# Patient Record
Sex: MALE | Race: WHITE | NOT HISPANIC OR LATINO | URBAN - METROPOLITAN AREA
[De-identification: names, ages, dates, MRNs, and addresses within clinical notes are randomized per-mention and may not be internally consistent; named-entity substitution may affect disease eponyms.]

---

## 2024-09-19 ENCOUNTER — TELEPHONE (OUTPATIENT)
Dept: HEMATOLOGY/ONCOLOGY | Facility: HOSPITAL | Age: 44
End: 2024-09-19
Payer: COMMERCIAL

## 2024-09-19 NOTE — TELEPHONE ENCOUNTER
RN called patient and left a message on voice mail asking patient to antoine back. Call back instructions reviewed.

## 2024-09-23 ENCOUNTER — PATIENT OUTREACH (OUTPATIENT)
Dept: HEMATOLOGY/ONCOLOGY | Facility: HOSPITAL | Age: 44
End: 2024-09-23
Payer: COMMERCIAL

## 2024-09-23 NOTE — PROGRESS NOTES
RN talked to patient. Patient has hemophilia B and said  that he was 1 years old when he was diagnosed. Patient saw Dr. Coronel at [facility] and had been seeing Dr. Isaiah Deng but he is only in clinic once a week and it has been hard to communicate with him. Patient is seeing Dr. Mahogany Rod in [place] but she want the patient connected to the [facility] in [place]. Patient aware of date, time and place. RN sent ane mail to patient with directions on how to find the appointment. Call back instructions reviewed.

## 2024-09-30 ENCOUNTER — DOCUMENTATION (OUTPATIENT)
Dept: HEMATOLOGY/ONCOLOGY | Facility: HOSPITAL | Age: 44
End: 2024-09-30

## 2024-09-30 ENCOUNTER — OFFICE VISIT (OUTPATIENT)
Dept: HEMATOLOGY/ONCOLOGY | Facility: HOSPITAL | Age: 44
End: 2024-09-30
Payer: COMMERCIAL

## 2024-09-30 ENCOUNTER — LAB (OUTPATIENT)
Dept: LAB | Facility: HOSPITAL | Age: 44
End: 2024-09-30
Payer: COMMERCIAL

## 2024-09-30 ENCOUNTER — DOCUMENTATION (OUTPATIENT)
Dept: PEDIATRIC HEMATOLOGY/ONCOLOGY | Facility: HOSPITAL | Age: 44
End: 2024-09-30

## 2024-09-30 VITALS
RESPIRATION RATE: 16 BRPM | DIASTOLIC BLOOD PRESSURE: 88 MMHG | HEART RATE: 73 BPM | HEIGHT: 70 IN | WEIGHT: 202.9 LBS | OXYGEN SATURATION: 99 % | BODY MASS INDEX: 29.05 KG/M2 | SYSTOLIC BLOOD PRESSURE: 130 MMHG | TEMPERATURE: 97 F

## 2024-09-30 DIAGNOSIS — D67 FACTOR IX HEMOPHILIA (MULTI): ICD-10-CM

## 2024-09-30 DIAGNOSIS — K76.0 FATTY LIVER DISEASE, NONALCOHOLIC: Primary | ICD-10-CM

## 2024-09-30 LAB
ABO GROUP (TYPE) IN BLOOD: NORMAL
APTT PPP: 39 SECONDS (ref 27–38)
BASOPHILS # BLD AUTO: 0.02 X10*3/UL (ref 0–0.1)
BASOPHILS NFR BLD AUTO: 0.4 %
EOSINOPHIL # BLD AUTO: 0.02 X10*3/UL (ref 0–0.7)
EOSINOPHIL NFR BLD AUTO: 0.4 %
ERYTHROCYTE [DISTWIDTH] IN BLOOD BY AUTOMATED COUNT: 12 % (ref 11.5–14.5)
FERRITIN SERPL-MCNC: 104 NG/ML (ref 20–300)
HBV CORE AB SER QL: NONREACTIVE
HBV SURFACE AB SER-ACNC: >1000 MIU/ML
HBV SURFACE AG SERPL QL IA: NONREACTIVE
HCT VFR BLD AUTO: 43.4 % (ref 41–52)
HCV AB SER QL: REACTIVE
HGB BLD-MCNC: 15.2 G/DL (ref 13.5–17.5)
HGB RETIC QN: 38 PG (ref 28–38)
HIV 1+2 AB+HIV1 P24 AG SERPL QL IA: NONREACTIVE
IMM GRANULOCYTES # BLD AUTO: 0.02 X10*3/UL (ref 0–0.7)
IMM GRANULOCYTES NFR BLD AUTO: 0.4 % (ref 0–0.9)
IMMATURE RETIC FRACTION: 9.7 %
INR PPP: 1 (ref 0.9–1.1)
IRON SATN MFR SERPL: 21 % (ref 25–45)
IRON SERPL-MCNC: 89 UG/DL (ref 35–150)
LYMPHOCYTES # BLD AUTO: 1.54 X10*3/UL (ref 1.2–4.8)
LYMPHOCYTES NFR BLD AUTO: 27.2 %
MCH RBC QN AUTO: 33.3 PG (ref 26–34)
MCHC RBC AUTO-ENTMCNC: 35 G/DL (ref 32–36)
MCV RBC AUTO: 95 FL (ref 80–100)
MONOCYTES # BLD AUTO: 0.4 X10*3/UL (ref 0.1–1)
MONOCYTES NFR BLD AUTO: 7.1 %
NEUTROPHILS # BLD AUTO: 3.67 X10*3/UL (ref 1.2–7.7)
NEUTROPHILS NFR BLD AUTO: 64.5 %
NRBC BLD-RTO: 0 /100 WBCS (ref 0–0)
PLATELET # BLD AUTO: 279 X10*3/UL (ref 150–450)
PROTHROMBIN TIME: 10.9 SECONDS (ref 9.8–12.8)
RBC # BLD AUTO: 4.57 X10*6/UL (ref 4.5–5.9)
RETICS #: 0.08 X10*6/UL (ref 0.02–0.12)
RETICS/RBC NFR AUTO: 1.7 % (ref 0.5–2)
RH FACTOR (ANTIGEN D): NORMAL
TIBC SERPL-MCNC: 432 UG/DL (ref 240–445)
UIBC SERPL-MCNC: 343 UG/DL (ref 110–370)
WBC # BLD AUTO: 5.7 X10*3/UL (ref 4.4–11.3)

## 2024-09-30 PROCEDURE — 86706 HEP B SURFACE ANTIBODY: CPT

## 2024-09-30 PROCEDURE — 99215 OFFICE O/P EST HI 40 MIN: CPT | Performed by: STUDENT IN AN ORGANIZED HEALTH CARE EDUCATION/TRAINING PROGRAM

## 2024-09-30 PROCEDURE — 36415 COLL VENOUS BLD VENIPUNCTURE: CPT

## 2024-09-30 PROCEDURE — 85250 CLOT FACTOR IX PTC/CHRSTMAS: CPT

## 2024-09-30 PROCEDURE — 87389 HIV-1 AG W/HIV-1&-2 AB AG IA: CPT

## 2024-09-30 PROCEDURE — 82728 ASSAY OF FERRITIN: CPT

## 2024-09-30 PROCEDURE — 86704 HEP B CORE ANTIBODY TOTAL: CPT

## 2024-09-30 PROCEDURE — 86803 HEPATITIS C AB TEST: CPT

## 2024-09-30 PROCEDURE — 85025 COMPLETE CBC W/AUTO DIFF WBC: CPT

## 2024-09-30 PROCEDURE — 85045 AUTOMATED RETICULOCYTE COUNT: CPT

## 2024-09-30 PROCEDURE — 87522 HEPATITIS C REVRS TRNSCRPJ: CPT

## 2024-09-30 PROCEDURE — 99205 OFFICE O/P NEW HI 60 MIN: CPT | Performed by: STUDENT IN AN ORGANIZED HEALTH CARE EDUCATION/TRAINING PROGRAM

## 2024-09-30 PROCEDURE — 85610 PROTHROMBIN TIME: CPT

## 2024-09-30 PROCEDURE — 85730 THROMBOPLASTIN TIME PARTIAL: CPT

## 2024-09-30 PROCEDURE — 83540 ASSAY OF IRON: CPT

## 2024-09-30 PROCEDURE — 87340 HEPATITIS B SURFACE AG IA: CPT

## 2024-09-30 PROCEDURE — 86901 BLOOD TYPING SEROLOGIC RH(D): CPT

## 2024-09-30 PROCEDURE — 3008F BODY MASS INDEX DOCD: CPT | Performed by: STUDENT IN AN ORGANIZED HEALTH CARE EDUCATION/TRAINING PROGRAM

## 2024-09-30 RX ORDER — CHOLECALCIFEROL (VITAMIN D3) 25 MCG
1000 TABLET ORAL
COMMUNITY

## 2024-09-30 RX ORDER — MULTIVIT-MIN/IRON FUM/FOLIC AC 7.5 MG-4
1 TABLET ORAL DAILY
COMMUNITY

## 2024-09-30 RX ORDER — PANTOPRAZOLE SODIUM 40 MG/1
1 TABLET, DELAYED RELEASE ORAL
COMMUNITY
Start: 2024-06-27

## 2024-09-30 ASSESSMENT — ENCOUNTER SYMPTOMS
OCCASIONAL FEELINGS OF UNSTEADINESS: 0
DEPRESSION: 0
LOSS OF SENSATION IN FEET: 0

## 2024-09-30 ASSESSMENT — PAIN SCALES - GENERAL: PAINLEVEL: 0-NO PAIN

## 2024-09-30 NOTE — PROGRESS NOTES
Patient ID: Killian Tena II is a 44 y.o. male.  Referring Physician: Mahogany Rod MD  [address] [place] [address]  Primary Care Provider: Morgan Santoyo MD, Richie Coffey MD in [place], [place]  Visit Type: Initial Visit  Diagnosis: severe IX deficiency/hemophilia B      Subjective    HPI  44 y.o. male with a PMH significant for known hemophilia B severe with recurrent bleeds.     Hemophilia B/IX deficiency:   Subtype: severe   Inhibitor status: no h/o inhibitor   Current prophylaxis regimen: alprolix 6000U/week x1  Previously managed at: [facility] adult [facility], [facility]   Target joints: ankle bilateral, knee left, iliopsoas as a child   Other major bleeds: GI bleeding source unclear   Previous surgical management plan: not on file   Infectious: historically negative, no testing on file     Additionally:   Previously followed at [facility] then transitioned to the adult clinic at [facility] main [place] with .   Used to maintain bleed monitoring but not recently. Notices about 12 bleeds per year more or less, is generally adherent with  the prophylaxis plan but occasionally delays/misses doses. Job is fairly physical and contributes to the bleeding events, additionally was only on on-demand therapy until 2012 when he had knee replacement. Mostly was not interested in prophylaxis before 2012.  Alprolix once weekly since 2012, 6000IU/dose. Has never been dosed more frequently. Has had presumably trough levels down to 2% last back in Jan '24.   Target joints are Right ankle and left knee. Left knee has been replaced in 12/2012, at [facility], no issues with bleeding since spontaneously.   Major bleeding: GI bleeding back in 10/2019 ibuprofen daily, scope showed scarring in the esophagus, colo was clear, did not find an ulcer at [place]. Noted melena and hematemesis. Has not recurred. As a child target area was iliopsoas and hands, not in adulthood. No brain bleeds.   Has never had an inhibitor.   Had  "FFP back in the day and had anaphylaxis a few times.   Mullein tea, milk thistle, are supplements he takes   Possible interested in new drugs and gene therapy but was recently informed about fatty liver and fibrosis, which is yet to be worked up     Age appropriate cancer screening: had a colo in 2019,  EGD as well. No p/h/o cancer.   FMH: 1 brother hemophilia B not prophylaxis worse bleeding rate, had a TBI at 10yrs and generally poorly controlled disease. Maternal grandfather and his brothers did have bleeding disorder, maternal uncles did not have bleeding issues, father side all negative. Father had 2 calf DVTs. Paternal grandfather had limb vascular issues as well.   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    Oncology History    No history exists.       BSA: 2.13 meters squared /88 (BP Location: Left arm, Patient Position: Sitting, BP Cuff Size: Large adult)   Pulse 73   Temp 36.1 °C (97 °F) (Skin)   Resp 16   Ht (S) 1.782 m (5' 10.16\")   Wt 92 kg (202 lb 14.4 oz)   SpO2 99%   BMI 28.98 kg/m²   Physical Exam    Assessment/Plan    44 y.o. male with a PMH significant for  hemophilia B severe with recurrent bleeds.      # Hemophilia B, w/o inhibitor: known diagnosis with +ve FMH as well has been on alprolix 6000U/week but with over 12 bleeds per year. Has had an acute bleed in last 3 weeks treated with extra doses of the alprolix.   Plan:  - pt to maintain a bleed log/diary   - testing today to clarify infectious state hep B, C, HIV  - PT eval done at visit with [facility]   - social work referral in  - visit with [facility] RN   - ref to hepatology, question of gene therapy predicated on the liver being functional   - follow up next: 3 months  - labs prior to follow up: pending   Will contact pt with results from today in about a week to go over plan. And confirm when " he took his last dose of alprolix.       Migue Norris MD

## 2024-09-30 NOTE — PROGRESS NOTES
"[facility] PT Consult    Patient: Killian Tena II  MRN: [id number]  09/30/24    Assessment   Killian is a 44 y.o. patient new to our [facility] with PMHx Hemophilia B who was seen by Physical Therapy in clinic on 9/30/2024. Pt states that he used to be seen by [facility], has not been back to a [facility] in some time. 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 He is on Alprolix 1x/week. States he has probably had around a dozen bleeds in the past year, with most of them being if he is late with his prophy dose. States in the past year he can recall having R ankle bleed(s) and R calf bleed. Says last R ankle bleed was probably about 3 weeks ago.     States R ankle is his target joint. Has seen ortho in the past for R ankle, and ortho was recommending fusion at that time which Killian states he is not interested in at this time. States for R ankle that he has an orthotic and wears an ACE wrap/sleeve. States he wears hiking boots at work since he is standing a lot on concrete hong. He says that walking is okay, stairs are \"crunchy\", and driving is okay. Let Killian know when and if he would like to see ortho again that the [facility] can help coordinate care. Killian verbalized understanding.     Pt states that he had a L knee replacement in 2012, it bled during Physical Therapy, and he now suffers with a contracture. He is able to fully extend his L knee, but unable to go past 90 deg L knee flex ROM. He states that this does not really affect his QOL. He does state that his R hip gives him problems as well, but he states they are not bleed related, and most likely due to the way he walks d/t other orthopedic issues (e.g., R ankle).     Upon assessment, as noted below, pt with mild crepitus R knee. L knee ROM 0-~90 deg. Limited DF ROM L ankle. Limited ROM grossly R ankle. Min swelling noted L ankle. Mod-severe swelling noted R ankle. No tenderness to palpation or warmth noted at any joints.       Plan/Recommendations:  Continue " "with ACE wrap/sleeve, orthotic, and supportive shoes for R ankle.   Consider referral to ortho  Physical Therapy to follow during clinic visits.    Subjective   Pt states that he used to be seen by [facility], has not been back to [facility] in some time. 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 He is on Alprolix 1x/week. States he has probably had around a dozen bleeds in the past year, with most of them being if he is late with his prophy dose. States in the past year he can recall having R ankle bleed(s) and R calf bleed. Says last R ankle bleed was probably about 3 weeks ago.     States R ankle is his target joint. Has seen ortho in the past for R ankle, and ortho was recommending fusion at that time which Killian states he is not interested in. States for R ankle that he has an orthotic and wears ACE wrap/sleeve. States he wears hiking boots at work since he is standing a lot on concrete hong. He says that walking is okay, stairs are \"crunchy\", and driving is okay.     Pt states that he had a L knee replacement in 2012, it bled during Physical Therapy, and he now suffers with a contracture. He states that this does not affect his QOL. He does state that his R hip gives him problems as well, but he states they are not bleed related, and most likely due to the way he walks d/t other orthopedic issues (e.g., R ankle).     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)  Equipment used at this time: orthotic (R ankle), ACE wrap    Objective   Joint Assessment:  Left Elbow: WFL  Right Elbow: WFL  Left Knee: WFL  Right Knee: mild crepitus noted  Left Ankle: mild edema noted  Right Ankle: mod-severe edema noted    Range of Motion:   Forearm Pronation: Left WNL and Right WNL  Forearm Supination: Left " WNL and Right WNL  Elbow Extention (0): Left WNL and Right WNL  Elbow Flexion (0-145): Left WNL and Right WNL  Knee Flexion (0-135): Left ~90 deg and Right WNL  Knee Extension (0): Left WNL and Right WNL  Ankle Plantarflexion (0-50): Left WNL and Right mod limitation  Ankle Dorsiflexion: Left min limitation and Right mod limitation  Ankle Inversion (0-30): Left WNL and Right mod limitation  Ankle Eversion (0-20): Left WNL and Right severe limitation      Manual Muscle Tests:  Left Elbow Flexion: 5/5   Right Elbow Flexion: 5/5   Left Elbow Extension: 5/5   Right Elbow Extension: 5/5   Left Knee Flexion: 5/5   Right Knee Flexion: 5/5   Left Knee Extension: 5/5   Right Knee Extension: 5/5     Valerie Vanegas, PT

## 2024-10-01 LAB — FACT IX ACT/NOR PPP: 21 % (ref 65–150)

## 2024-10-02 ENCOUNTER — TELEPHONE (OUTPATIENT)
Dept: HEMATOLOGY/ONCOLOGY | Facility: CLINIC | Age: 44
End: 2024-10-02
Payer: COMMERCIAL

## 2024-10-02 PROBLEM — K76.0 FATTY LIVER DISEASE, NONALCOHOLIC: Status: ACTIVE | Noted: 2024-10-02

## 2024-10-02 LAB
HCV RNA SERPL NAA+PROBE-ACNC: NOT DETECTED K[IU]/ML
HCV RNA SERPL NAA+PROBE-LOG IU: NORMAL {LOG_IU}/ML

## 2024-10-02 NOTE — TELEPHONE ENCOUNTER
Called patient about last dose of allprolix, which he stated it was Friday sept 27. He also stated that he only has 3-4 bleeds a year. He was mistaken in clinic, I also reiterated to schedule with hepatology. He is all set.   Pily EDWARDS

## 2024-10-07 NOTE — PROGRESS NOTES
[facility] Nursing Note    Killian is a 44 y.o with PMH severe hemophilia B in clinic today for comprehensive visit and to establish care with Dr. Grady. Patient was seen by nursing, physical therapy, social work, and physician. Patient was followed by [facility] and then transitioned to adult care at [facility]. Patient is on once weekly 6000 Unit Altuviiio prophy dosing since 2012. He has never dosed more frequently and dosed on demand prior to 2012. Patient states he still has about 12 bleeds per year on prophy dosing. He states he is generally adherent but occasionally misses or delays doses. 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 Patient states his target joints are right ankle and left knee. He had a left knee replacement in 2012 and says it's been doing better since then. Patient currently receives 4 doses of 6000 unit Alprolix a month through [facility] and would like to stay with the same pharmacy. Discussed increasing dosing schedule r/t frequency of bleeds but would like to see trough levels prior. Patient agreeable to plan. Patient has no upcoming procedures or dental work but will call if that changes. He states he hasn't been to the dentist in many years and would like to establish care with a new dentist. Patient educated on importance of calling [facility] before dental work to discuss treatment plan. Discussed starting a bleeding log with patient and he was agreeable to the plan. Patient choice policy signed. Emergency med card updated, reviewed, and given to patient. Follow up visit scheduled in 3 months.

## 2024-10-09 DIAGNOSIS — D67 FACTOR IX HEMOPHILIA (MULTI): Primary | ICD-10-CM

## 2024-10-11 ENCOUNTER — LAB (OUTPATIENT)
Dept: LAB | Facility: LAB | Age: 44
End: 2024-10-11
Payer: COMMERCIAL

## 2024-10-11 DIAGNOSIS — D67 FACTOR IX HEMOPHILIA (MULTI): ICD-10-CM

## 2024-10-11 PROCEDURE — 85250 CLOT FACTOR IX PTC/CHRSTMAS: CPT

## 2024-10-14 LAB — FACT IX ACT/NOR PPP: 10 % (ref 65–150)

## 2024-10-21 NOTE — PROGRESS NOTES
"Gastroenterology Clinic Consult Note    Reason For Consult  Fatty liver    History Of Present Illness  Killian Tena II is a 44 y.o. male with a past medical history of severe Hemophilia B, self-cleared Hepatitis C who was referred to hepatology clinic for fatty liver    Follows with Dr. Grady for his hemophilia B. Has had >12 severe bleeds this year. Is being considered for gene therapy which is \"predicated on the liver being functional\".    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 Never been on treatment.    Had liver ultrasound done in [place] at [facility] which reportedly showed fatty liver earlier this year. He has been told he has mildly elevated liver enzymes in the past before.    He has been drinking alcohol daily since his 30s. Used to have up to 12 beers a night, but in the last few months has cut down to 3-6 beers per night.     Past Medical History  Hemophilia B  Suspected Miller's, never biopsied    Surgical History  Right ankle arthroscopy  Left knee arthroscopy  Left total knee replacement    Social History  Social Drivers of Health     Tobacco Use: Low Risk  (10/23/2024)    Patient History     Smoking Tobacco Use: Never     Smokeless Tobacco Use: Never     Passive Exposure: Not on file   Alcohol Use: Not on file   Financial Resource Strain: Not on file   Food Insecurity: Not on file   Transportation Needs: Not on file   Physical Activity: Not on file   Stress: Not on file   Social Connections: Not on file   Intimate Partner Violence: Not on file   Depression: Not at risk (1/5/2024)    [facility] from [facility], [facility]    PHQ-2     Score (Questions 1 & 2): 0   Housing Stability: Not on file   Utilities: Not on file   Digital Equity: Not on file   Health Literacy: Not on file     Alcohol daily - up to 12 beers daily after work since age 29, has cut back now 3-6 beers daily     Family History  Mother - fatty liver but was a drinker  Maternal grandmother " "- cirrhosis but never drinker     Allergies  Allergies   Allergen Reactions    Aspirin Bleeding     Pt cannot take meds due to him dx with hemophilia.       Home Medications    Current Outpatient Medications:     cholecalciferol (Vitamin D-3) 25 MCG (1000 UT) tablet, Take 1 tablet (1,000 Units) by mouth once daily., Disp: , Rfl:     coagulation factor IX, rFIXFc, (Alprolix) 3,000 unit injection, Infuse 6,000 Int'l Units/day into a venous catheter once a week., Disp: , Rfl:     MILK THISTLE ORAL, Take by mouth., Disp: , Rfl:     multivitamin with minerals tablet, Take 1 tablet by mouth once daily., Disp: , Rfl:     pantoprazole (ProtoNix) 40 mg EC tablet, Take 1 tablet (40 mg) by mouth every 12 hours., Disp: , Rfl:     Review of Systems  12-point review of systems is negative    Last Recorded Vitals  Blood pressure 139/83, pulse 93, temperature 36.9 °C (98.4 °F), height 1.803 m (5' 11\"), weight 93 kg (205 lb), SpO2 97%.    Physical Exam  General: well-nourished, no acute distress  HEENT: PERRLA, EOM intact, no scleral icterus, moist MM  Respiratory: CTA bilaterally, normal work of breathing  Cardiovascular: RRR, no murmurs/rubs/gallops  Abdomen: Soft, nontender, nondistended, bowel sounds present, no masses palpated, no organomeagly  Extremities: no edema, no asterixis  Neuro: alert and oriented, CNII-XII grossly intact, moves all 4 extremities with no focal deficits    Relevant Results    Current Outpatient Medications:     cholecalciferol (Vitamin D-3) 25 MCG (1000 UT) tablet, Take 1 tablet (1,000 Units) by mouth once daily., Disp: , Rfl:     coagulation factor IX, rFIXFc, (Alprolix) 3,000 unit injection, Infuse 6,000 Int'l Units/day into a venous catheter once a week., Disp: , Rfl:     MILK THISTLE ORAL, Take by mouth., Disp: , Rfl:     multivitamin with minerals tablet, Take 1 tablet by mouth once daily., Disp: , Rfl:     pantoprazole (ProtoNix) 40 mg EC tablet, Take 1 tablet (40 mg) by mouth every 12 hours., " "Disp: , Rfl:      Lab Results   Component Value Date    WBC 5.7 09/30/2024    HGB 15.2 09/30/2024    HCT 43.4 09/30/2024    MCV 95 09/30/2024     09/30/2024     No results found for: \"GLUCOSE\", \"CALCIUM\", \"NA\", \"K\", \"CO2\", \"CL\", \"BUN\", \"CREATININE\"  No results found for: \"ALT\", \"AST\", \"GGT\", \"ALKPHOS\", \"BILITOT\"    Imaging:  None viewable    Procedures:     ASSESSMENT/PLAN:  Killian Tena II is a 44 y.o. male with a past medical history of severe Hemophilia B, self-cleared hepatitis C who was referred to hepatology clinic for fatty liver    Mr. Tena's heavy alcohol use could explain the reported fatty liver seen on OSH US liver. Last LFTs in system are normal. INR is normal. Platelets are normal. He does not seem to have synthetic dysfunction. We will plan to obtain fibroscan to stage his AIDEN.    Plan:  -MELD labs today  -Obtain fibroscan  -Recommend decreasing alcohol intake vs complete cessation  -If no evidence of advanced fibrosis, then likely will be safe to proceed with planned gene therapy/clinical trial with Dr. Grady  -If evidence of advanced fibrosis on fibroscan, then we should see him back in clinic in 6 months    Patient was seen and discussed with Dr. Lerner    I spent 35 minutes in the professional and overall care of this patient.    Kianna Villanueva MD  PGY5 Gastroenterology Fellow  [facility]    "

## 2024-10-23 ENCOUNTER — LAB (OUTPATIENT)
Dept: LAB | Facility: LAB | Age: 44
End: 2024-10-23
Payer: COMMERCIAL

## 2024-10-23 ENCOUNTER — OFFICE VISIT (OUTPATIENT)
Dept: GASTROENTEROLOGY | Facility: HOSPITAL | Age: 44
End: 2024-10-23
Payer: COMMERCIAL

## 2024-10-23 VITALS
TEMPERATURE: 98.4 F | DIASTOLIC BLOOD PRESSURE: 83 MMHG | BODY MASS INDEX: 28.7 KG/M2 | SYSTOLIC BLOOD PRESSURE: 139 MMHG | HEIGHT: 71 IN | OXYGEN SATURATION: 97 % | HEART RATE: 93 BPM | WEIGHT: 205 LBS

## 2024-10-23 DIAGNOSIS — K76.0 FATTY LIVER DISEASE, NONALCOHOLIC: ICD-10-CM

## 2024-10-23 DIAGNOSIS — D67 FACTOR IX HEMOPHILIA (MULTI): ICD-10-CM

## 2024-10-23 LAB
ALBUMIN SERPL BCP-MCNC: 4.7 G/DL (ref 3.4–5)
ALP SERPL-CCNC: 80 U/L (ref 33–120)
ALT SERPL W P-5'-P-CCNC: 15 U/L (ref 10–52)
ANION GAP SERPL CALC-SCNC: 15 MMOL/L (ref 10–20)
AST SERPL W P-5'-P-CCNC: 19 U/L (ref 9–39)
BILIRUB SERPL-MCNC: 0.7 MG/DL (ref 0–1.2)
BUN SERPL-MCNC: 8 MG/DL (ref 6–23)
CALCIUM SERPL-MCNC: 9.9 MG/DL (ref 8.6–10.6)
CHLORIDE SERPL-SCNC: 101 MMOL/L (ref 98–107)
CO2 SERPL-SCNC: 29 MMOL/L (ref 21–32)
CREAT SERPL-MCNC: 0.78 MG/DL (ref 0.5–1.3)
EGFRCR SERPLBLD CKD-EPI 2021: >90 ML/MIN/1.73M*2
GLUCOSE SERPL-MCNC: 92 MG/DL (ref 74–99)
INR PPP: 1 (ref 0.9–1.1)
POTASSIUM SERPL-SCNC: 4.4 MMOL/L (ref 3.5–5.3)
PROT SERPL-MCNC: 7.8 G/DL (ref 6.4–8.2)
PROTHROMBIN TIME: 11.3 SECONDS (ref 9.8–12.8)
SODIUM SERPL-SCNC: 141 MMOL/L (ref 136–145)

## 2024-10-23 PROCEDURE — 99214 OFFICE O/P EST MOD 30 MIN: CPT | Performed by: INTERNAL MEDICINE

## 2024-10-23 PROCEDURE — 1036F TOBACCO NON-USER: CPT | Performed by: INTERNAL MEDICINE

## 2024-10-23 PROCEDURE — 80053 COMPREHEN METABOLIC PANEL: CPT

## 2024-10-23 PROCEDURE — 99204 OFFICE O/P NEW MOD 45 MIN: CPT | Performed by: INTERNAL MEDICINE

## 2024-10-23 PROCEDURE — 36415 COLL VENOUS BLD VENIPUNCTURE: CPT

## 2024-10-23 PROCEDURE — 3008F BODY MASS INDEX DOCD: CPT | Performed by: INTERNAL MEDICINE

## 2024-10-23 PROCEDURE — 85610 PROTHROMBIN TIME: CPT

## 2024-10-23 ASSESSMENT — PAIN SCALES - GENERAL: PAINLEVEL_OUTOF10: 2

## 2024-10-23 NOTE — PATIENT INSTRUCTIONS
It was a pleasure seeing you in Hepatology today.  Please stop by the lab on the first floor for a blood draw before leaving today  Please schedule your fibroscan  We recommend you decrease your alcohol in take to no more than 1-2 alcoholic drinks per day. Less if possible, complete abstinence if possible.  Continue to follow with Dr. Grady.   If your fibroscan shows advanced liver scarring, you will need to come back and see us in Hepatology clinic in 6 months

## 2024-10-24 ENCOUNTER — TELEPHONE (OUTPATIENT)
Dept: GASTROENTEROLOGY | Facility: HOSPITAL | Age: 44
End: 2024-10-24
Payer: COMMERCIAL

## 2024-12-30 ENCOUNTER — APPOINTMENT (OUTPATIENT)
Dept: HEMATOLOGY/ONCOLOGY | Facility: HOSPITAL | Age: 44
End: 2024-12-30
Payer: COMMERCIAL